# Patient Record
Sex: MALE | Race: WHITE
[De-identification: names, ages, dates, MRNs, and addresses within clinical notes are randomized per-mention and may not be internally consistent; named-entity substitution may affect disease eponyms.]

---

## 2019-04-28 ENCOUNTER — HOSPITAL ENCOUNTER (EMERGENCY)
Dept: HOSPITAL 94 - ER | Age: 44
Discharge: HOME | End: 2019-04-28
Payer: MEDICAID

## 2019-04-28 VITALS — BODY MASS INDEX: 25.98 KG/M2 | HEIGHT: 69 IN | WEIGHT: 175.38 LBS

## 2019-04-28 VITALS — SYSTOLIC BLOOD PRESSURE: 123 MMHG | DIASTOLIC BLOOD PRESSURE: 87 MMHG
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
## 2019-04-28 DIAGNOSIS — S71.111A: Primary | ICD-10-CM
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
## 2019-04-28 DIAGNOSIS — S80.811A: ICD-10-CM

## 2019-04-28 PROCEDURE — 99283 EMERGENCY DEPT VISIT LOW MDM: CPT

## 2019-04-28 PROCEDURE — 90471 IMMUNIZATION ADMIN: CPT

## 2019-04-28 PROCEDURE — 90715 TDAP VACCINE 7 YRS/> IM: CPT

## 2019-04-28 PROCEDURE — 12002 RPR S/N/AX/GEN/TRNK2.6-7.5CM: CPT

## 2019-04-28 NOTE — NUR
Patient educated on the importance of going notifying his neighbor that their 
dogs need to be quarintined for at least 10 days. If dogs begin to show signs 
of rabies or die he needs to report back to the ER for Rabies vaccinations.